# Patient Record
(demographics unavailable — no encounter records)

---

## 2017-01-30 NOTE — GOP
[f 
rep st]



                                                                OPERATIVE REPORT





DATE OF OPERATION:  2017



SURGEON:  Laura Orantes MD



ASSISTANT:  Elaine Smith CNM



ANESTHESIA:  Spinal.



PREOPERATIVE DIAGNOSIS:  Intrauterine pregnancy at term with breech 
presentation.



POSTOPERATIVE DIAGNOSIS:  Intrauterine pregnancy at term with breech 
presentation.



PROCEDURE PERFORMED:  Primary low transverse  section.



FINDINGS:  Viable female infant, Apgars 8 and 8, weight 7 pounds 9 ounces, in 
danielle breech presentation.  Normal uterus, fallopian tubes, and ovaries.



ESTIMATED BLOOD LOSS:  800 mL.



INDICATIONS:  The patient is a 36-year-old female who was noted to have a fetus 
in breech presentation and desired primary  delivery.



DESCRIPTION OF PROCEDURE:  The patient was taken to the operating room, where 
she was prepped and draped in the normal sterile fashion in the dorsal supine 
position with a leftward tilt.  The patient received 2 g of Ancef 
preoperatively.  A Pfannenstiel skin incision was made with a scalpel and 
carried through to the underlying fascia.  The fascia was incised in the 
midline and extended laterally.  The superior aspect of the fascia was grasped 
with Kocher clamps.  Rectus muscles dissected off bluntly and with the Bovie 
cautery.  The inferior aspect of the fascia was grasped with the Kocher clamps, 
and the rectus muscle was dissected off bluntly and with the Bovie cautery.  
The peritoneum was identified and entered bluntly.  The peritoneum was divided.
  The bladder blade was placed.  Vesicouterine peritoneum was incised with the 
Metzenbaum scissors, and the bladder flap was created digitally.  The bladder 
blade was replaced.  The uterus was incised with a scalpel, and the uterine 
incision was extended laterally.  The infant was delivered.  The cord was 
clamped and cut.  Cord blood was obtained.  Cord gases were obtained.  The 
infant was handed to the waiting nurse practitioner.  The placenta was 
delivered spontaneously.  The uterus was exteriorized and cleared of all clots 
and debris.  The uterine incision was reapproximated with 0 Monocryl in a 
running locked fashion in 2 layers.  The uterus was returned to the abdomen.  
The gutters were cleared of all clots and debris.  The uterine incision was 
reinspected and made hemostatic with 1 more figure-of-eight stitch in the right 
corner.  The uterine incision was inspected and made hemostatic.  The 
subfascial spaces were inspected and noted to be hemostatic.  The fascia was 
reapproximated with 0 Vicryl.  There was 1 aspect of the right rectus muscle 
that was bleeding that was made hemostatic with 0 Vicryl.  The subcutaneous 
tissue was irrigated and closed with 3-0 Vicryl.  The skin was closed with 4-0 
Monocryl.  All counts were correct x2.  The patient tolerated the procedure 
well.



COMPLICATIONS:  None.



OUTCOME:  Stable, to recovery room.





Job #:  [id number]

MTDD

## 2017-01-30 NOTE — POSTANESTH
Post Anesthetic Evaluation


Cardiovascular Status: Normal, Stable


Respiratory Status: Normal, Stable


Level of Consciousness/Mental Status: Can Participate in Eval, Alert and 

Oriented


Pain Control: Adequate, Prn Tx Ordered


Nausea/Vomiting Control: Adequate, Prn Tx Ordered


Complications Possibly Related to Anesthesia: None Noted (LE still immobile 

secondary to spinal LA dose for C/S.)

## 2017-01-30 NOTE — OBPROC
- Delivery


Pre-op Diagnoses: Breech presentation


Procedure: Primary


Surgeon: Laura Crane


Assistant: Elaine Smith


Anesthesia: Spinal


Complications: None

## 2017-01-31 NOTE — SOAPPROG
SOAP Progress Note


Assessment/Plan: 


Assessment:


 POD#1 s/p pLTCS for breech


Hct appropriate this AM


s/p Tdap, flu declined


Rh pos, Rub immune























Plan:


Routine post-operative care


D/C IV, transition to oral pain medications


Lactation


Discussed home POD#2 vs 3





17 08:42








17 08:45





Subjective: 


Feeling well. Baby is breastfeeding. Pain controlled. Has already ambulated, 

hall was removed and she was able to void. No dizziness or weakness. 

Tolerating regular diet. Is passing gas. Bleeding minimal. 


Objective: 





 Vital Signs











Temp Pulse Resp BP Pulse Ox


 


 36.7 C   86   20   96/58 L  95 


 


 17 04:00  17 06:00  17 06:00  17 04:00  17 06:00








 Laboratory Results





 17 06:05 





 











 17





 05:59 05:59 05:59


 


Intake Total  5425 


 


Output Total  5450 


 


Balance  -25 








Gen: alert, awake, NAD


Breasts: soft


Resp: unlabored


CV: RRR


Abd: soft, appropriately distended/tender


Incision: steri strips with minimal staining


Ext: no edema





ICD10 Worksheet


Patient Problems: 


 Problems











Problem Status Diagnosed


 


 delivery delivered Acute

## 2017-02-01 NOTE — SOAPPROG
SOAP Progress Note


Assessment/Plan: 


Assessment:


36 y.o.  female s/p primary C/S for breech. Postpartum/ post-op day #2. 

Recovering well with incision CDI and healing well. Breastfeeding infant with 

assistance and will begin pumping. Good pain control. Appropriate mood with 

good support system,























Plan:


Routine postpartum/ post-op care. Lactation consult. Anticipate discharge 

tomorrow.





17 08:09





Subjective: 


Reports feeling well with good pain control and minimal vaginal bleeding. 

Incision CDI. Eating and drinking without nausea or vomiting. Has been out of 

bed and ambulating without vertigo. Appropriate mood with good support system.





Objective: 





 Vital Signs











Temp Pulse Resp BP Pulse Ox


 


 37.2 C   84   18   108/61   93 


 


 17 02:41  17 02:41  17 02:41  17 02:41  17 02:41








 Laboratory Results





 17 06:05 





 











 17





 05:59 05:59 05:59


 


Intake Total 5425  


 


Output Total 5450  


 


Balance -25  














- Time Spent With Patient


Time Spent With Patient: 


20 minutes








- Pending Discharge


Pending Discharge Within 24 Hours: Yes


Pending Discharge Date: 17


Pending Discharge Time: 11:00





Physical Exam





- Physical Exam


General Appearance: WD/WN, alert, no apparent distress


EENT: normal ENT inspection


Neck: non-tender, full range of motion


Respiratory: lungs clear, normal breath sounds


Cardiac/Chest: regular rate, rhythm


Abdomen: non-tender, soft


Pelvic Exam: normal external exam


Rectal: deferred


Back: Normal inspection


Skin: normal color, warm/dry


Lymphatic: no adenopathy


Extremities: normal range of motion, non-tender


Neuro/Psych: alert, normal mood/affect, oriented x 3





ICD10 Worksheet


Patient Problems: 


 Problems











Problem Status Diagnosed


 


 delivery delivered Acute

## 2017-02-02 NOTE — SOAPPROG
SOAP Progress Note


Assessment/Plan: 


Assessment:


 POD#3 s/p pLTCS for breech


s/p Tdap, flu declined


Rh pos, Rub immune


Stable for discharge home





Plan:


D/C home today


Routine precautions discussed


F/u 2 weeks with Dr. LR for incision check


Rx for Norco, Motrin, Colace. Continue PNV


Discharge paperwork completed in chart





17 08:42








17 08:45








17 12:50





Subjective: 


Ready to go home. BF going well. Pain is minimal, mostly just using motrin. 

Bleeding very light. 


Objective: 





 Vital Signs











Temp Pulse Resp BP Pulse Ox


 


 36.2 C   67   16   117/74   95 


 


 17 08:27  17 08:27  17 08:27  17 08:27  17 08:27








 Laboratory Results





 17 06:05 





Gen: NAD


Breasts: soft


Abd: soft, minimal tenderness, non distended


Incision: clean/dry/intact with steri strips


Ext: no edema





- Time Spent With Patient


Time Spent With Patient: 


15 minutes





ICD10 Worksheet


Patient Problems: 


 Problems











Problem Status Diagnosed


 


 delivery delivered Acute